# Patient Record
Sex: MALE | URBAN - METROPOLITAN AREA
[De-identification: names, ages, dates, MRNs, and addresses within clinical notes are randomized per-mention and may not be internally consistent; named-entity substitution may affect disease eponyms.]

---

## 2017-05-23 NOTE — PATIENT DISCUSSION
PROLIFERATIVE DIABETIC RETINOPATHY, OD: STABLE.  RETURN FOR FOLLOW-UP AS SCHEDULED FOR DILATED EYE EXAM.

## 2017-05-23 NOTE — PATIENT DISCUSSION
DIABETIC MACULAR EDEMA, OD: PATIENT ELECTS TO HOLD TREATMENT DUE TO LP VISION AND GUARDED PROGNOSIS FOR VISUAL IMPROVEMENT.

## 2017-05-23 NOTE — PATIENT DISCUSSION
Post-laser (KERRI) Counseling:  Treatment of retinal breaks with laser greatly reduces but does not completely eliminate the risk of retinal detachment. Keep any scheduled appointments for re-evaluation of the retina, and report any increase in flashing lights or floaters.

## 2017-10-10 NOTE — PATIENT DISCUSSION
CENTRAL RETINAL ARTERY OCCLUSION, OD:   PATIENT INSTRUCTED TO RETURN AS SCHEDULED TO SCREEN FOR COMPLICATIONS OF ARTERIAL OCCLUSION.

## 2017-10-10 NOTE — PATIENT DISCUSSION
PROLIFERATIVE DIABETIC RETINOPATHY, OU:  STABLE. S/P PRP LASER OU. PATIENT ELECTS TO OBSERVE DME OS AT THIS TIME.

## 2017-10-10 NOTE — PATIENT DISCUSSION
Central Retinal Artery Occlusion Counseling: Though there are not treatments that have been shown to reverse the damage done be a retinal artery occlusion, follow-up is necessary to evaluate for serious complications that can occur, including formation of new, abnormal blood vessels and elevated eye pressure. Return as scheduled to minimize chance of problems or further loss of vision from these complications.

## 2018-03-27 NOTE — PATIENT DISCUSSION
PROLIFERATIVE DIABETIC RETINOPATHY W/ VITREOUS HEMORRHAGE, OU:  DISCUSSED TREATMENT OPTIONS INCLUDING PPV/LASER, ANTI-VEGF THERAPY, OBSERVATION. PATIENT ELECTS TO START WITH ANTI-VEGF THERAPY BOTH EYES TODAY.

## 2018-04-24 NOTE — PATIENT DISCUSSION
VITREOUS HEMORRAGE OU: SIGNIFICANT IMPROVEMENT. DISCUSSED TX OPTIONS, AND RECOMMEND CONTINUING ANTI-VEGF THERAPY AT THIS TIME.

## 2020-10-13 ENCOUNTER — IMPORTED ENCOUNTER (OUTPATIENT)
Dept: URBAN - METROPOLITAN AREA CLINIC 50 | Facility: CLINIC | Age: 80
End: 2020-10-13

## 2020-10-19 ENCOUNTER — IMPORTED ENCOUNTER (OUTPATIENT)
Dept: URBAN - METROPOLITAN AREA CLINIC 50 | Facility: CLINIC | Age: 80
End: 2020-10-19

## 2020-11-03 ENCOUNTER — IMPORTED ENCOUNTER (OUTPATIENT)
Dept: URBAN - METROPOLITAN AREA CLINIC 50 | Facility: CLINIC | Age: 80
End: 2020-11-03

## 2020-11-05 ENCOUNTER — IMPORTED ENCOUNTER (OUTPATIENT)
Dept: URBAN - METROPOLITAN AREA CLINIC 50 | Facility: CLINIC | Age: 80
End: 2020-11-05

## 2021-04-23 ASSESSMENT — VISUAL ACUITY
OS_CC: J1-1
OD_CC: J1
OD_CC: 20/30
OS_BAT: 20/30
OS_CC: J1
OS_CC: 20/25+1
OD_BAT: 20/40
OS_OTHER: 20/30. 20/50.
OD_OTHER: 20/40. 20/60.
OD_CC: J1-1
OD_CC: 20/40
OS_CC: 20/25-

## 2021-04-23 ASSESSMENT — PACHYMETRY
OS_CT_UM: 538
OD_CT_UM: 543
OD_CT_UM: 543
OS_CT_UM: 538

## 2021-04-23 ASSESSMENT — TONOMETRY
OD_IOP_MMHG: 18
OS_IOP_MMHG: 18

## 2022-02-24 ENCOUNTER — PREPPED CHART (OUTPATIENT)
Dept: URBAN - METROPOLITAN AREA CLINIC 48 | Facility: CLINIC | Age: 82
End: 2022-02-24

## 2022-02-28 ENCOUNTER — COMPREHENSIVE EXAM (OUTPATIENT)
Dept: URBAN - METROPOLITAN AREA CLINIC 48 | Facility: CLINIC | Age: 82
End: 2022-02-28

## 2022-02-28 DIAGNOSIS — H25.813: ICD-10-CM

## 2022-02-28 DIAGNOSIS — H16.223: ICD-10-CM

## 2022-02-28 DIAGNOSIS — H52.4: ICD-10-CM

## 2022-02-28 DIAGNOSIS — H40.013: ICD-10-CM

## 2022-02-28 DIAGNOSIS — H43.812: ICD-10-CM

## 2022-02-28 DIAGNOSIS — H35.372: ICD-10-CM

## 2022-02-28 PROCEDURE — 92134 CPTRZ OPH DX IMG PST SGM RTA: CPT

## 2022-02-28 PROCEDURE — 92015 DETERMINE REFRACTIVE STATE: CPT

## 2022-02-28 PROCEDURE — 92014 COMPRE OPH EXAM EST PT 1/>: CPT

## 2022-02-28 ASSESSMENT — VISUAL ACUITY
OS_GLARE: 20/50
OD_PH: 20/60+1
OD_GLARE: 20/70
OU_CC: J1
OS_PH: 20/40
OD_GLARE: 20/100
OS_CC: J1
OS_GLARE: 20/60
OD_CC: J3
OD_CC: 20/80

## 2022-02-28 ASSESSMENT — TONOMETRY
OS_IOP_MMHG: 18
OD_IOP_MMHG: 17

## 2022-02-28 NOTE — PATIENT DISCUSSION
GLAUCOMA SUSPECT-C/D: The patient is a glaucoma suspect because of suspicious appearance of the optic disc(s) OD>OS. IOP today 17/18 WNL. (-) family hx. RTC at patients convince for HVF/OCT RNFL. IOP check in 6 months.

## 2022-02-28 NOTE — PATIENT DISCUSSION
Went over drop regiment with patient. Advised patient to start PF artificial tears, warm compresses, lid scrubs, and gel drops or ointment.

## 2022-04-14 ENCOUNTER — DIAGNOSTICS ONLY (OUTPATIENT)
Dept: URBAN - METROPOLITAN AREA CLINIC 48 | Facility: CLINIC | Age: 82
End: 2022-04-14

## 2022-04-14 DIAGNOSIS — H40.013: ICD-10-CM

## 2022-04-14 PROCEDURE — 92083 EXTENDED VISUAL FIELD XM: CPT

## 2022-04-14 PROCEDURE — 92133 CPTRZD OPH DX IMG PST SGM ON: CPT

## 2022-09-06 ENCOUNTER — COMPREHENSIVE EXAM (OUTPATIENT)
Dept: URBAN - METROPOLITAN AREA CLINIC 48 | Facility: LOCATION | Age: 82
End: 2022-09-06

## 2022-09-06 DIAGNOSIS — H35.372: ICD-10-CM

## 2022-09-06 DIAGNOSIS — H43.813: ICD-10-CM

## 2022-09-06 DIAGNOSIS — H25.813: ICD-10-CM

## 2022-09-06 DIAGNOSIS — H40.013: ICD-10-CM

## 2022-09-06 DIAGNOSIS — H04.123: ICD-10-CM

## 2022-09-06 DIAGNOSIS — H35.362: ICD-10-CM

## 2022-09-06 DIAGNOSIS — H52.4: ICD-10-CM

## 2022-09-06 PROCEDURE — 92014 COMPRE OPH EXAM EST PT 1/>: CPT

## 2022-09-06 PROCEDURE — 76514 ECHO EXAM OF EYE THICKNESS: CPT

## 2022-09-06 ASSESSMENT — PACHYMETRY
OD_CT_UM: 548
OS_CT_UM: 537

## 2022-09-06 ASSESSMENT — TONOMETRY
OS_IOP_MMHG: 18
OS_IOP_MMHG: 19
OD_IOP_MMHG: 17

## 2022-09-06 ASSESSMENT — VISUAL ACUITY
OD_PH: 20/40
OS_GLARE: 20/100
OD_GLARE: 20/80
OU_CC: J1
OS_GLARE: 20/400
OS_CC: 20/40
OD_GLARE: 20/400
OD_CC: 20/50

## 2022-11-08 ENCOUNTER — PRE-OP/H&P (OUTPATIENT)
Dept: URBAN - METROPOLITAN AREA CLINIC 49 | Facility: CLINIC | Age: 82
End: 2022-11-08

## 2022-11-08 DIAGNOSIS — H25.13: ICD-10-CM

## 2022-11-08 DIAGNOSIS — H35.362: ICD-10-CM

## 2022-11-08 DIAGNOSIS — H40.013: ICD-10-CM

## 2022-11-08 DIAGNOSIS — H43.813: ICD-10-CM

## 2022-11-08 DIAGNOSIS — H35.372: ICD-10-CM

## 2022-11-08 PROCEDURE — 92136 OPHTHALMIC BIOMETRY: CPT

## 2022-11-08 PROCEDURE — 92025AV CORNEAL TOPOGRAPHY, AV

## 2022-11-08 PROCEDURE — PREOP PRE OP VISIT

## 2022-11-08 ASSESSMENT — VISUAL ACUITY
OD_PH: 20/30
OS_GLARE: 20/100
OS_CC: 20/40
OD_GLARE: 20/400
OS_CC: J1
OS_GLARE: 20/400
OD_CC: 20/50
OD_GLARE: 20/80
OS_PH: 20/30
OU_CC: J1
OU_CC: 20/40
OD_CC: J2

## 2022-11-08 ASSESSMENT — KERATOMETRY
OD_K1POWER_DIOPTERS: 42.50
OS_AXISANGLE_DEGREES: 134
OD_AXISANGLE_DEGREES: 30
OD_K2POWER_DIOPTERS: 42.00
OD_AXISANGLE2_DEGREES: 120
OS_K1POWER_DIOPTERS: 43.37
OS_AXISANGLE2_DEGREES: 44
OS_K2POWER_DIOPTERS: 42.50

## 2022-11-08 ASSESSMENT — TONOMETRY
OS_IOP_MMHG: 17
OS_IOP_MMHG: 18
OD_IOP_MMHG: 17
OD_IOP_MMHG: 17

## 2022-11-08 NOTE — PATIENT DISCUSSION
Biometry notes- wants custom set for near. Pt understands that he will have to put on glasses to see in the distance or when driving.  Patient elected for AV PKG once in with MD.

## 2022-11-08 NOTE — PATIENT DISCUSSION
AV OD/OS: Discussed with patient in detail laser-assisted vs traditional cataract surgery and all available lens options as well as their associated risks, benefits, limitations and out-of-pocket costs. Patient is a candidate for [facility] OU. Patient wishes to proceed with cataract surgery with [facility] OD. Proceed with cataract surgery OS with [facility], based on confirmation of first eye results, and patient's complaint. The patient understands that with any option, including multifocal/EDOF, there is no guarantee that they will not require glasses to see their best at any distance after surgery. The patient was also educated of the possibility of increased glare/halos around lights. The risks, benefits, and alternatives to surgery were discussed and the patient's questions were answered.

## 2022-11-08 NOTE — PATIENT DISCUSSION
IOP stable without medicated drop therapy. Patient to continue following Dr. Abbie Sellers as scheduled.

## 2022-11-17 ENCOUNTER — SURGERY/PROCEDURE (OUTPATIENT)
Dept: URBAN - METROPOLITAN AREA SURGERY 16 | Facility: SURGERY | Age: 82
End: 2022-11-17

## 2022-11-17 ENCOUNTER — POST-OP (OUTPATIENT)
Dept: URBAN - METROPOLITAN AREA CLINIC 48 | Facility: LOCATION | Age: 82
End: 2022-11-17

## 2022-11-17 DIAGNOSIS — H25.11: ICD-10-CM

## 2022-11-17 DIAGNOSIS — Z98.41: ICD-10-CM

## 2022-11-17 DIAGNOSIS — Z96.1: ICD-10-CM

## 2022-11-17 PROCEDURE — 99199PAV ADVANCED VISION

## 2022-11-17 PROCEDURE — 66984AV REMOVE CATARACT, INSERT ADVANCED LENS

## 2022-11-17 ASSESSMENT — KERATOMETRY
OD_AXISANGLE_DEGREES: 30
OS_K2POWER_DIOPTERS: 42.50
OS_AXISANGLE_DEGREES: 134
OS_AXISANGLE_DEGREES: 134
OS_AXISANGLE2_DEGREES: 44
OD_AXISANGLE2_DEGREES: 120
OD_AXISANGLE_DEGREES: 30
OD_K1POWER_DIOPTERS: 42.50
OS_AXISANGLE2_DEGREES: 44
OS_K2POWER_DIOPTERS: 42.50
OS_K1POWER_DIOPTERS: 43.37
OD_K2POWER_DIOPTERS: 42.00
OD_K2POWER_DIOPTERS: 42.00
OS_K1POWER_DIOPTERS: 43.37
OD_K1POWER_DIOPTERS: 42.50
OD_AXISANGLE2_DEGREES: 120

## 2022-11-17 ASSESSMENT — TONOMETRY: OD_IOP_MMHG: 11

## 2022-11-17 ASSESSMENT — VISUAL ACUITY: OD_SC: 20/80+

## 2022-11-17 NOTE — PATIENT DISCUSSION
IOP stable without medicated drop therapy. Patient to continue following Dr. Heather Kunz as scheduled.

## 2022-11-17 NOTE — PATIENT DISCUSSION
IOP stable without medicated drop therapy. Patient to continue following Dr. Mike Shields as scheduled.

## 2022-11-23 ENCOUNTER — POST OP/EVAL OF SECOND EYE (OUTPATIENT)
Dept: URBAN - METROPOLITAN AREA CLINIC 53 | Facility: CLINIC | Age: 82
End: 2022-11-23

## 2022-11-23 DIAGNOSIS — Z96.1: ICD-10-CM

## 2022-11-23 DIAGNOSIS — Z98.41: ICD-10-CM

## 2022-11-23 DIAGNOSIS — H25.12: ICD-10-CM

## 2022-11-23 PROCEDURE — 92136 - 2N OPHTHALMIC BIOMETRY BY PARTIAL COHERENCE INTERFEROMETRY WITH INTRAOCULAR LENS POWER CALCULATION

## 2022-11-23 PROCEDURE — 99213 OFFICE O/P EST LOW 20 MIN: CPT

## 2022-11-23 ASSESSMENT — KERATOMETRY
OS_K2POWER_DIOPTERS: 43.50
OD_AXISANGLE2_DEGREES: 91
OD_K1POWER_DIOPTERS: 45.50
OS_AXISANGLE2_DEGREES: 145
OD_K2POWER_DIOPTERS: 47.25
OS_AXISANGLE_DEGREES: 55
OS_K1POWER_DIOPTERS: 43.00
OD_AXISANGLE_DEGREES: 1

## 2022-11-23 ASSESSMENT — TONOMETRY
OD_IOP_MMHG: 16
OS_IOP_MMHG: 16

## 2022-11-23 ASSESSMENT — VISUAL ACUITY
OU_SC: J3 @ 16"
OS_PH: 20/25-1
OS_SC: 20/40
OD_SC: 20/40
OS_GLARE: 20/400
OU_SC: 20/25
OS_GLARE: 20/100

## 2022-11-23 NOTE — PATIENT DISCUSSION
Patient elects to proceed with cataract surgery OS with previously discussed plan ([id number] 21.0 vs 31.5 [employer] [place] with ORA).

## 2022-11-23 NOTE — PATIENT DISCUSSION
AV OS: Discussed with patient in detail laser-assisted vs traditional cataract surgery and all available lens options as well as their associated risks, benefits, limitations and out-of-pocket costs. Patient is a candidate for [facility] OU. Patient wishes to proceed with cataract surgery with [facility] OD. Proceed with cataract surgery OS with [facility], based on confirmation of first eye results, and patient's complaint. The patient understands that with any option, including multifocal/EDOF, there is no guarantee that they will not require glasses to see their best at any distance after surgery. The patient was also educated of the possibility of increased glare/halos around lights. The risks, benefits, and alternatives to surgery were discussed and the patient's questions were answered.

## 2022-11-23 NOTE — PATIENT DISCUSSION
IOP stable without medicated drop therapy. Patient to continue following Dr. Deanna Mac as scheduled.

## 2022-12-01 ENCOUNTER — POST-OP (OUTPATIENT)
Dept: URBAN - METROPOLITAN AREA CLINIC 49 | Facility: CLINIC | Age: 82
End: 2022-12-01

## 2022-12-01 ENCOUNTER — SURGERY/PROCEDURE (OUTPATIENT)
Dept: URBAN - METROPOLITAN AREA SURGERY 16 | Facility: SURGERY | Age: 82
End: 2022-12-01

## 2022-12-01 PROCEDURE — 99199PAV ADVANCED VISION

## 2022-12-01 PROCEDURE — 66984AV REMOVE CATARACT, INSERT ADVANCED LENS

## 2022-12-01 ASSESSMENT — KERATOMETRY
OS_AXISANGLE2_DEGREES: 145
OS_AXISANGLE_DEGREES: 55
OD_K2POWER_DIOPTERS: 47.25
OS_K2POWER_DIOPTERS: 43.50
OD_K1POWER_DIOPTERS: 45.50
OD_AXISANGLE2_DEGREES: 91
OD_AXISANGLE_DEGREES: 1
OS_K1POWER_DIOPTERS: 43.00

## 2022-12-01 ASSESSMENT — TONOMETRY: OS_IOP_MMHG: 23

## 2022-12-01 ASSESSMENT — VISUAL ACUITY
OS_SC: 20/100-1
OS_PH: 20/80

## 2022-12-01 NOTE — PATIENT DISCUSSION
IOP stable without medicated drop therapy. Patient to continue following Dr. Micah Bennett as scheduled.

## 2022-12-06 ENCOUNTER — POST-OP (OUTPATIENT)
Dept: URBAN - METROPOLITAN AREA CLINIC 48 | Facility: LOCATION | Age: 82
End: 2022-12-06

## 2022-12-06 PROCEDURE — 99024 POSTOP FOLLOW-UP VISIT: CPT

## 2022-12-06 ASSESSMENT — VISUAL ACUITY
OS_SC: 20/25
OS_SC: J1
OD_SC: 20/25-2
OS_SC: J1-

## 2022-12-06 ASSESSMENT — TONOMETRY
OS_IOP_MMHG: 17
OD_IOP_MMHG: 16
OS_IOP_MMHG: 16
OD_IOP_MMHG: 16

## 2022-12-06 NOTE — PATIENT DISCUSSION
IOP stable without medicated drop therapy. Patient to continue following Dr. Gasper Mcfarlane as scheduled.

## 2022-12-22 NOTE — PATIENT DISCUSSION
It was discussed with the patient the importance of good control of their blood sugar and medication usage under the guidance of their diabetic doctor to prevent/halt diabetic retinopathy.

## 2022-12-27 ENCOUNTER — POST-OP (OUTPATIENT)
Dept: URBAN - METROPOLITAN AREA CLINIC 48 | Facility: LOCATION | Age: 82
End: 2022-12-27

## 2022-12-27 PROCEDURE — 92134 CPTRZ OPH DX IMG PST SGM RTA: CPT

## 2022-12-27 ASSESSMENT — KERATOMETRY
OD_K1POWER_DIOPTERS: 42.50
OS_K1POWER_DIOPTERS: 42.75
OD_AXISANGLE_DEGREES: 140
OD_AXISANGLE2_DEGREES: 50
OS_K2POWER_DIOPTERS: 43.25
OD_K2POWER_DIOPTERS: 43.00
OS_AXISANGLE_DEGREES: 75
OS_AXISANGLE2_DEGREES: 165

## 2022-12-27 ASSESSMENT — VISUAL ACUITY
OS_GLARE: 20/40
OD_SC: 20/30-2
OD_GLARE: 20/40
OU_SC: J1+ @ 14"
OD_GLARE: 20/30
OS_GLARE: 20/30
OS_SC: 20/25-1

## 2022-12-27 ASSESSMENT — TONOMETRY
OS_IOP_MMHG: 12
OD_IOP_MMHG: 12

## 2022-12-27 NOTE — PATIENT DISCUSSION
Advised patient to use preservative-free artificial tears (Systane Complete, sample given) QID OU, warm compresses BID OU, and lid scrubs BID OU.

## 2022-12-27 NOTE — PATIENT DISCUSSION
Per mod CDs OU. IOP 17/18. (-)Fam hx. PACHS 548/537. OCT RNFL (04/2022) WNL OU. HVF (04/2022) non-glaucomatous OU. Will monitor.

## 2023-01-18 ENCOUNTER — CONSULTATION/EVALUATION (OUTPATIENT)
Dept: URBAN - METROPOLITAN AREA CLINIC 49 | Facility: CLINIC | Age: 83
End: 2023-01-18

## 2023-01-18 DIAGNOSIS — D23.121: ICD-10-CM

## 2023-01-18 DIAGNOSIS — D23.111: ICD-10-CM

## 2023-01-18 PROCEDURE — 92012 INTRM OPH EXAM EST PATIENT: CPT

## 2023-01-18 ASSESSMENT — TONOMETRY
OD_IOP_MMHG: 13
OS_IOP_MMHG: 13
OS_IOP_MMHG: 14
OD_IOP_MMHG: 13

## 2023-01-18 ASSESSMENT — KERATOMETRY
OD_K2POWER_DIOPTERS: 43.00
OS_AXISANGLE2_DEGREES: 165
OD_K1POWER_DIOPTERS: 42.50
OD_AXISANGLE_DEGREES: 140
OS_AXISANGLE_DEGREES: 75
OS_K2POWER_DIOPTERS: 43.25
OS_K1POWER_DIOPTERS: 42.75
OD_AXISANGLE2_DEGREES: 50

## 2023-01-18 ASSESSMENT — VISUAL ACUITY
OS_SC: 20/25
OD_SC: 20/25-2

## 2023-01-18 NOTE — PATIENT DISCUSSION
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 When I do the excision, if it is not a hydrocystoma, will send to pathology.

## 2023-01-23 ENCOUNTER — CLINIC PROCEDURE ONLY (OUTPATIENT)
Dept: URBAN - METROPOLITAN AREA CLINIC 49 | Facility: CLINIC | Age: 83
End: 2023-01-23

## 2023-01-23 DIAGNOSIS — D23.111: ICD-10-CM

## 2023-01-23 PROCEDURE — 67840 REMOVE EYELID LESION: CPT

## 2023-01-23 ASSESSMENT — KERATOMETRY
OD_K2POWER_DIOPTERS: 43.00
OS_AXISANGLE2_DEGREES: 165
OD_K1POWER_DIOPTERS: 42.50
OS_K1POWER_DIOPTERS: 42.75
OS_K2POWER_DIOPTERS: 43.25
OD_AXISANGLE2_DEGREES: 50
OD_AXISANGLE_DEGREES: 140
OS_AXISANGLE_DEGREES: 75

## 2023-01-23 ASSESSMENT — VISUAL ACUITY
OD_SC: 20/25-2
OS_SC: 20/25-2

## 2023-01-23 ASSESSMENT — TONOMETRY
OS_IOP_MMHG: 12
OD_IOP_MMHG: 12

## 2023-01-23 NOTE — PROCEDURE NOTE: CLINICAL
PROCEDURE NOTE: Excision of Eyelid Lesion Right Upper Lid. Diagnosis: Other Benign Neoplasm of Skin of Eyelid, Including [place]. Anesthesia: 2% Lidocaine with Epi. Prep: Betadine Flush. Prior to treatment, the risks/benefits/alternatives were discussed. The patient wished to proceed with procedure. Local anesthetic was given. The lesion was incised and removed. The wound was cauterized to achieve hemostasis. Patient tolerated procedure well. There were no complications. Post procedure instructions given. Wilman Mckeon

## 2023-01-23 NOTE — PATIENT DISCUSSION
Excision done in office today and sending to pathology. Patient singed consent. Erx'katelynn villa. Patient to RTC 1-2 weeks for follow up.

## 2023-09-06 ENCOUNTER — COMPREHENSIVE EXAM (OUTPATIENT)
Dept: URBAN - METROPOLITAN AREA CLINIC 48 | Facility: LOCATION | Age: 83
End: 2023-09-06

## 2023-09-06 DIAGNOSIS — H40.013: ICD-10-CM

## 2023-09-06 DIAGNOSIS — H11.153: ICD-10-CM

## 2023-09-06 DIAGNOSIS — H35.372: ICD-10-CM

## 2023-09-06 DIAGNOSIS — H35.362: ICD-10-CM

## 2023-09-06 DIAGNOSIS — H26.493: ICD-10-CM

## 2023-09-06 DIAGNOSIS — H04.123: ICD-10-CM

## 2023-09-06 DIAGNOSIS — H43.813: ICD-10-CM

## 2023-09-06 PROCEDURE — 92014 COMPRE OPH EXAM EST PT 1/>: CPT

## 2023-09-06 PROCEDURE — 92134 CPTRZ OPH DX IMG PST SGM RTA: CPT

## 2023-09-06 ASSESSMENT — VISUAL ACUITY
OU_SC: J1
OS_GLARE: 20/30
OS_SC: 20/25-2
OD_GLARE: 20/30
OD_GLARE: 20/30
OS_GLARE: 20/40
OD_SC: 20/25

## 2023-09-06 ASSESSMENT — KERATOMETRY
OD_K2POWER_DIOPTERS: 43.00
OS_AXISANGLE2_DEGREES: 55
OD_K1POWER_DIOPTERS: 42.25
OS_K1POWER_DIOPTERS: 43.00
OD_AXISANGLE_DEGREES: 140
OS_AXISANGLE_DEGREES: 145
OS_K2POWER_DIOPTERS: 42.50
OD_AXISANGLE2_DEGREES: 50

## 2023-09-06 ASSESSMENT — TONOMETRY
OD_IOP_MMHG: 13
OS_IOP_MMHG: 13